# Patient Record
Sex: FEMALE | URBAN - METROPOLITAN AREA
[De-identification: names, ages, dates, MRNs, and addresses within clinical notes are randomized per-mention and may not be internally consistent; named-entity substitution may affect disease eponyms.]

---

## 2017-05-04 ENCOUNTER — TELEPHONE (OUTPATIENT)
Dept: NURSING | Facility: CLINIC | Age: 22
End: 2017-05-04

## 2017-05-05 NOTE — TELEPHONE ENCOUNTER
Call Type: Triage Call    Presenting Problem: Pt is pregnant she states her LMP was 3/10/17 and  she has had positive pregnancy tests.  This evening at approx 6:30  p.m. she started having cramping with mild red bleeding.  She is  wearing a pad and states she would consider this light bleeding, no  clots.  If symptoms worsen this evening pt will go to ER if symptoms  continue as they are she will go to clinic tomorrow.  Triage Note:  Guideline Title: Pregnancy: Spontaneous Termination, Less Than 20 Weeks  Recommended Disposition: See Provider within 24 hours  Original Inclination: Would have called clinic  Override Disposition:  Intended Action: Follow advice given  Physician Contacted: No  Vaginal spotting AND pregnancy confirmed or menses more than 2 weeks late ?  YES  Passing tissue from the vagina ? NO  Moderate vaginal bleeding ? NO  Vaginal bleeding AND greater than 20 weeks gestation ? NO  Experiencing contractions AND 20-37 weeks gestation ? NO  Continuous mild vaginal bleeding ? NO  Abdominal pain AND greater than 20 weeks gestation ? NO  New or worsening signs and symptoms that may indicate shock ? NO  More than 37 weeks gestation AND contractions ? NO  Prior ectopic pregnancy AND abdominal cramping or continuous mild vaginal bleeding  ? NO  IUD in place AND vaginal bleeding or abdominal cramping ? NO  Recent positive pregnancy test or menses late AND new onset of pain on top or back  of shoulders ? NO  Unbearable abdominal, pelvic or back pain ? NO  Lower abdominal, pelvic or back pain with any bleeding ? NO  Lower abdominal, pelvic or back pain for 4 hours or more ? NO  Foul smelling or unusual vaginal discharge (such as change in color, odor, or  amount of vaginal discharge) ? NO  Cerclage (cervix sutured closed) in place AND any vaginal bleeding or fluid ? NO  Heavy vaginal bleeding (soaking 1 pad every hour for 2 hours or more) ? NO  Continuous bright red vaginal bleeding for more than 15 minutes  (more than  spotting) ? NO  Persistent dizziness OR lightheadedness that does not resolve within ten minutes ?  NO  Physician Instructions:  Care Advice: